# Patient Record
Sex: FEMALE | Race: BLACK OR AFRICAN AMERICAN | NOT HISPANIC OR LATINO | URBAN - METROPOLITAN AREA
[De-identification: names, ages, dates, MRNs, and addresses within clinical notes are randomized per-mention and may not be internally consistent; named-entity substitution may affect disease eponyms.]

---

## 2022-03-28 ENCOUNTER — WEB ENCOUNTER (OUTPATIENT)
Dept: URBAN - METROPOLITAN AREA CLINIC 52 | Facility: CLINIC | Age: 47
End: 2022-03-28

## 2022-03-30 ENCOUNTER — OFFICE VISIT (OUTPATIENT)
Dept: URBAN - METROPOLITAN AREA CLINIC 52 | Facility: CLINIC | Age: 47
End: 2022-03-30
Payer: COMMERCIAL

## 2022-03-30 ENCOUNTER — LAB OUTSIDE AN ENCOUNTER (OUTPATIENT)
Dept: URBAN - METROPOLITAN AREA CLINIC 52 | Facility: CLINIC | Age: 47
End: 2022-03-30

## 2022-03-30 ENCOUNTER — DASHBOARD ENCOUNTERS (OUTPATIENT)
Age: 47
End: 2022-03-30

## 2022-03-30 VITALS
DIASTOLIC BLOOD PRESSURE: 98 MMHG | HEART RATE: 75 BPM | WEIGHT: 163.2 LBS | SYSTOLIC BLOOD PRESSURE: 143 MMHG | HEIGHT: 65 IN | TEMPERATURE: 97.7 F | BODY MASS INDEX: 27.19 KG/M2

## 2022-03-30 DIAGNOSIS — K59.09 CHRONIC CONSTIPATION: ICD-10-CM

## 2022-03-30 DIAGNOSIS — Z12.11 COLON CANCER SCREENING: ICD-10-CM

## 2022-03-30 PROCEDURE — 99202 OFFICE O/P NEW SF 15 MIN: CPT | Performed by: PHYSICIAN ASSISTANT

## 2022-03-30 PROCEDURE — 99202 OFFICE O/P NEW SF 15 MIN: CPT | Performed by: INTERNAL MEDICINE

## 2022-03-30 RX ORDER — LABETALOL HYDROCHLORIDE 300 MG/1
AS DIRECTED TABLET ORAL
Status: ACTIVE | COMMUNITY

## 2022-03-30 NOTE — HPI-TODAY'S VISIT:
This is a 47 y.o. female with PMH HTN and asthma, who was referred by Dr. Lexie Fuentes for colon cancer screen.  She has never had a colonoscopy.  She denies family h/o colon cancer, abdominal pain, black or bloody stools or change in bowel habits. She does report chronic constipation for years, and will have one bowel movement a week.

## 2022-04-28 ENCOUNTER — OFFICE VISIT (OUTPATIENT)
Dept: URBAN - METROPOLITAN AREA SURGERY CENTER 17 | Facility: SURGERY CENTER | Age: 47
End: 2022-04-28
Payer: COMMERCIAL

## 2022-04-28 ENCOUNTER — CLAIMS CREATED FROM THE CLAIM WINDOW (OUTPATIENT)
Dept: URBAN - METROPOLITAN AREA CLINIC 4 | Facility: CLINIC | Age: 47
End: 2022-04-28
Payer: COMMERCIAL

## 2022-04-28 DIAGNOSIS — D12.4 BENIGN NEOPLASM OF DESCENDING COLON: ICD-10-CM

## 2022-04-28 DIAGNOSIS — K63.89 BACTERIAL OVERGROWTH SYNDROME: ICD-10-CM

## 2022-04-28 DIAGNOSIS — K63.89 CYST OF DUODENUM: ICD-10-CM

## 2022-04-28 DIAGNOSIS — Z12.11 COLON CANCER SCREENING: ICD-10-CM

## 2022-04-28 DIAGNOSIS — D12.2 BENIGN NEOPLASM OF ASCENDING COLON: ICD-10-CM

## 2022-04-28 DIAGNOSIS — D12.4 ADENOMA OF DESCENDING COLON: ICD-10-CM

## 2022-04-28 DIAGNOSIS — D12.2 ADENOMA OF ASCENDING COLON: ICD-10-CM

## 2022-04-28 PROCEDURE — G8907 PT DOC NO EVENTS ON DISCHARG: HCPCS | Performed by: INTERNAL MEDICINE

## 2022-04-28 PROCEDURE — 45380 COLONOSCOPY AND BIOPSY: CPT | Performed by: INTERNAL MEDICINE

## 2022-04-28 PROCEDURE — 45385 COLONOSCOPY W/LESION REMOVAL: CPT | Performed by: INTERNAL MEDICINE

## 2022-04-28 PROCEDURE — 88305 TISSUE EXAM BY PATHOLOGIST: CPT | Performed by: PATHOLOGY

## 2024-01-23 ENCOUNTER — APPOINTMENT (RX ONLY)
Dept: URBAN - METROPOLITAN AREA CLINIC 33 | Facility: CLINIC | Age: 49
Setting detail: DERMATOLOGY
End: 2024-01-23

## 2024-01-23 DIAGNOSIS — L81.6 OTHER DISORDERS OF DIMINISHED MELANIN FORMATION: ICD-10-CM

## 2024-01-23 PROCEDURE — ? COUNSELING

## 2024-01-23 PROCEDURE — 99202 OFFICE O/P NEW SF 15 MIN: CPT

## 2024-01-23 PROCEDURE — ? TREATMENT REGIMEN

## 2024-01-23 ASSESSMENT — LOCATION DETAILED DESCRIPTION DERM
LOCATION DETAILED: LEFT DISTAL CALF
LOCATION DETAILED: RIGHT DISTAL CALF

## 2024-01-23 ASSESSMENT — LOCATION SIMPLE DESCRIPTION DERM
LOCATION SIMPLE: LEFT CALF
LOCATION SIMPLE: RIGHT CALF

## 2024-01-23 ASSESSMENT — LOCATION ZONE DERM: LOCATION ZONE: LEG

## 2024-01-23 NOTE — HPI: DISCOLORATION
How Severe Is Your Skin Discoloration?: mild
Additional History: Pt is here concerning hypopigmentation of the left and right lower due to microdermabrasion 8yrs ago. Pt states that she was prescribed hydrocortisone to treat but unsure whether it help.

## 2024-03-14 ENCOUNTER — APPOINTMENT (RX ONLY)
Dept: URBAN - METROPOLITAN AREA CLINIC 33 | Facility: CLINIC | Age: 49
Setting detail: DERMATOLOGY
End: 2024-03-14

## 2024-03-14 DIAGNOSIS — L81.4 OTHER MELANIN HYPERPIGMENTATION: ICD-10-CM

## 2024-03-14 PROCEDURE — ? ADDITIONAL NOTES

## 2024-03-14 PROCEDURE — ? COUNSELING

## 2024-03-14 PROCEDURE — ? CHEMICAL PEEL (CUSTOM)

## 2024-03-14 PROCEDURE — ? INVENTORY

## 2024-03-14 ASSESSMENT — LOCATION SIMPLE DESCRIPTION DERM
LOCATION SIMPLE: LEFT PRETIBIAL REGION
LOCATION SIMPLE: RIGHT PRETIBIAL REGION

## 2024-03-14 ASSESSMENT — LOCATION DETAILED DESCRIPTION DERM
LOCATION DETAILED: LEFT PROXIMAL PRETIBIAL REGION
LOCATION DETAILED: RIGHT DISTAL PRETIBIAL REGION

## 2024-03-14 ASSESSMENT — LOCATION ZONE DERM: LOCATION ZONE: LEG

## 2024-03-14 NOTE — PROCEDURE: CHEMICAL PEEL (CUSTOM)
Consent: Prior to the procedure, written consent was obtained and risks, benefits, expectations and alternatives were reviewed, including, but not limited to,  redness, peeling, blistering, pigmentary change, scarring, infection, and pain.
Detail Level: Zone
Treatment Number: 1

## 2024-03-14 NOTE — PROCEDURE: ADDITIONAL NOTES
Additional Notes: 14% Salicylic acid, 14% lactic acid, 10% phenylethyl resorcinol was used
Detail Level: Simple
Render Risk Assessment In Note?: no